# Patient Record
Sex: FEMALE | Race: WHITE | NOT HISPANIC OR LATINO | URBAN - METROPOLITAN AREA
[De-identification: names, ages, dates, MRNs, and addresses within clinical notes are randomized per-mention and may not be internally consistent; named-entity substitution may affect disease eponyms.]

---

## 2024-05-08 ENCOUNTER — OFFICE VISIT (OUTPATIENT)
Dept: PRIMARY CARE | Facility: CLINIC | Age: 24
End: 2024-05-08
Payer: COMMERCIAL

## 2024-05-08 VITALS
HEIGHT: 67 IN | WEIGHT: 142.3 LBS | DIASTOLIC BLOOD PRESSURE: 80 MMHG | BODY MASS INDEX: 22.34 KG/M2 | HEART RATE: 77 BPM | SYSTOLIC BLOOD PRESSURE: 120 MMHG

## 2024-05-08 DIAGNOSIS — S06.0X0D CONCUSSION WITHOUT LOSS OF CONSCIOUSNESS, SUBSEQUENT ENCOUNTER: Primary | ICD-10-CM

## 2024-05-08 PROCEDURE — 1036F TOBACCO NON-USER: CPT | Performed by: INTERNAL MEDICINE

## 2024-05-08 PROCEDURE — 99203 OFFICE O/P NEW LOW 30 MIN: CPT | Performed by: INTERNAL MEDICINE

## 2024-05-08 RX ORDER — DEXTROAMPHETAMINE SACCHARATE, AMPHETAMINE ASPARTATE MONOHYDRATE, DEXTROAMPHETAMINE SULFATE AND AMPHETAMINE SULFATE 5; 5; 5; 5 MG/1; MG/1; MG/1; MG/1
40 CAPSULE, EXTENDED RELEASE ORAL
COMMUNITY
Start: 2024-05-06 | End: 2024-06-05

## 2024-05-08 ASSESSMENT — ENCOUNTER SYMPTOMS
HEADACHES: 0
DIZZINESS: 0
FATIGUE: 0

## 2024-05-08 NOTE — PROGRESS NOTES
"Subjective   Patient ID: Sarah Huizar is a 23 y.o. female who presents for Follow-up (Sarah was seen in there ER and was told to follow up with a Doctor.).    Prevents for ER follow up. 2 weeks ago was on a motorized skateboard (can go up to 20mph) but lost control and fell off. Hit her head but denies LOC. Was confused afterwards and had a HA, so was evaluated in the hospital. Imaging done that revealed SDH, so trauma tried to admit pt but she left AMA for the night. Returned next day for further evaluation. Repeat imaging done. Recommended to follow up with ENT and NSGY and get repeat CTH in 3 weeks. Seen by NSGY, but repeat CTH was never ordered. Pt states since coming home her memory, balance, and hearing out of her L ear have improved but are not back to normal. Headaches overall improved as well, with last being 2 days ago. Never had change in vision. Works in a restaurant.        Review of Systems   Constitutional:  Negative for fatigue.   Musculoskeletal:  Negative for gait problem.   Neurological:  Negative for dizziness and headaches.       /80 (BP Location: Right arm, Patient Position: Sitting, BP Cuff Size: Adult)   Pulse 77   Ht 1.7 m (5' 6.93\")   Wt 64.5 kg (142 lb 4.8 oz)   BMI 22.33 kg/m²   Objective   Physical Exam  Constitutional:       General: She is not in acute distress.     Appearance: She is not ill-appearing, toxic-appearing or diaphoretic.   HENT:      Head: Normocephalic and atraumatic.   Eyes:      Extraocular Movements: Extraocular movements intact.      Conjunctiva/sclera: Conjunctivae normal.      Pupils: Pupils are equal, round, and reactive to light.   Cardiovascular:      Rate and Rhythm: Normal rate and regular rhythm.      Heart sounds: No murmur heard.     No friction rub. No gallop.   Pulmonary:      Effort: Pulmonary effort is normal. No respiratory distress.      Breath sounds: No stridor. No wheezing, rhonchi or rales.   Abdominal:      General: Abdomen is flat. " Bowel sounds are normal. There is no distension.      Palpations: Abdomen is soft.      Tenderness: There is no abdominal tenderness. There is no guarding.   Musculoskeletal:      Cervical back: Normal range of motion. No rigidity or tenderness.   Lymphadenopathy:      Cervical: No cervical adenopathy.   Skin:     General: Skin is warm and dry.   Neurological:      Mental Status: She is alert.      Cranial Nerves: No cranial nerve deficit.      Sensory: No sensory deficit.      Motor: No weakness (5/5 in all 4 extremities).         Assessment/Plan   Problem List Items Addressed This Visit    None  Visit Diagnoses         Codes    Concussion without loss of consciousness, subsequent encounter    -  Primary S06.0X0D        -Pt presenting as follow up from concussion. I reviewed both ER visits, notes, and imaging. Needs return to work letter. Signed letter stating pt can return, but that if she becomes symptomatic then she needs to be relieved.  -Pt encouraged to call NSGY about the recommendation to repeat CTH in 3 weeks (seeing as that would be 3 days from now). Will call after today's appt.  -If pt wants to be established with this office, encouraged to follow up in 1 month.         Daniella Reno MD 05/08/24 12:12 PM

## 2024-05-08 NOTE — LETTER
May 8, 2024     Patient: Sarah Huizar   YOB: 2000   Date of Visit: 5/8/2024       To Whom It May Concern:    Sarah Huizar was seen in my clinic on 5/8/2024 at 11:00 am. She is able to return to work, however if she begins to fatigue or become symptomatic (headache, nausea, dizziness, difficulty walking, etc), then she needs to rest and be excused. It is anticipated that symptoms will gradually improve, but each patient is different in terms of how quickly they recover.      If you have any questions or concerns, please don't hesitate to call.         Sincerely,         Daniella Reno MD

## 2024-06-12 ENCOUNTER — APPOINTMENT (OUTPATIENT)
Dept: PRIMARY CARE | Facility: CLINIC | Age: 24
End: 2024-06-12
Payer: COMMERCIAL

## 2024-12-20 ENCOUNTER — PROCEDURE VISIT (OUTPATIENT)
Dept: AUDIOLOGY | Age: 24
End: 2024-12-20
Payer: COMMERCIAL

## 2024-12-20 ENCOUNTER — OFFICE VISIT (OUTPATIENT)
Dept: ENT CLINIC | Age: 24
End: 2024-12-20

## 2024-12-20 VITALS
RESPIRATION RATE: 16 BRPM | HEIGHT: 67 IN | TEMPERATURE: 97.8 F | BODY MASS INDEX: 23.23 KG/M2 | WEIGHT: 148 LBS | SYSTOLIC BLOOD PRESSURE: 116 MMHG | DIASTOLIC BLOOD PRESSURE: 72 MMHG | HEART RATE: 98 BPM

## 2024-12-20 DIAGNOSIS — H93.8X2 SENSATION OF FULLNESS IN LEFT EAR: ICD-10-CM

## 2024-12-20 DIAGNOSIS — H90.42 SENSORINEURAL HEARING LOSS (SNHL) OF LEFT EAR WITH UNRESTRICTED HEARING OF RIGHT EAR: Primary | ICD-10-CM

## 2024-12-20 DIAGNOSIS — H90.42 SENSORINEURAL HEARING LOSS (SNHL) OF LEFT EAR WITH UNRESTRICTED HEARING OF RIGHT EAR: ICD-10-CM

## 2024-12-20 DIAGNOSIS — S02.19XS: ICD-10-CM

## 2024-12-20 DIAGNOSIS — G43.709 CHRONIC MIGRAINE WITHOUT AURA WITHOUT STATUS MIGRAINOSUS, NOT INTRACTABLE: ICD-10-CM

## 2024-12-20 DIAGNOSIS — H93.12 TINNITUS OF LEFT EAR: ICD-10-CM

## 2024-12-20 DIAGNOSIS — H93.12 TINNITUS OF LEFT EAR: Primary | ICD-10-CM

## 2024-12-20 PROCEDURE — 92567 TYMPANOMETRY: CPT

## 2024-12-20 PROCEDURE — 92557 COMPREHENSIVE HEARING TEST: CPT

## 2024-12-20 RX ORDER — NALTREXONE HYDROCHLORIDE 50 MG/1
TABLET, FILM COATED ORAL
COMMUNITY
Start: 2024-11-25

## 2024-12-20 RX ORDER — MIRTAZAPINE 15 MG/1
15 TABLET, FILM COATED ORAL NIGHTLY
COMMUNITY
Start: 2024-12-13

## 2024-12-20 RX ORDER — QUETIAPINE FUMARATE 200 MG/1
200 TABLET, FILM COATED ORAL NIGHTLY
COMMUNITY
Start: 2024-12-17

## 2024-12-20 ASSESSMENT — ENCOUNTER SYMPTOMS
EYE ITCHING: 0
EYE DISCHARGE: 0
CHOKING: 0
PHOTOPHOBIA: 0
VOICE CHANGE: 0
NAUSEA: 0
TROUBLE SWALLOWING: 0
SORE THROAT: 0
SINUS PRESSURE: 0
DIARRHEA: 0
CONSTIPATION: 0
SHORTNESS OF BREATH: 0
VOMITING: 0
SINUS PAIN: 0
RHINORRHEA: 0
WHEEZING: 0
STRIDOR: 0
FACIAL SWELLING: 0
BACK PAIN: 0
COUGH: 0
BLOOD IN STOOL: 0
COLOR CHANGE: 0

## 2024-12-20 NOTE — PROGRESS NOTES
[facility]  [address] , [address]  [place] [address]  P: [phone]  F: [phone]       Patient     Aziza Fox  2000    ChiefComplaint     Chief Complaint   Patient presents with    Hearing Problem     Fractured skull April since then left ear not hearing so good there was some drainage but nothing now       History of Present Illness     Aziza Fox is a pleasant 24 y.o. female who presents as a new for left-sided tinnitus and muffled hearing.  In April, the patient suffered a left-sided temporal bone fracture.  She was seen at [facility].  Outside CT report reviewed.  She did not follow-up with any outside physician after the injury.  She has been experiencing frequent migraine headaches, brain fog, fluctuation of hearing and significant tinnitus in the left ear.  The tinnitus she describes as a high-pitched nonpulsatile ringing noise.  Sometimes it is nonexistent.  Other times it comes on very intensely and she cannot hear out of the ear.  Denies any syncope.    Past Medical History     Past Medical History:   Diagnosis Date    Headache     Hearing loss        Past Surgical History     No past surgical history on file.    Family History     No family history on file.    Social History     Social History     Socioeconomic History    Marital status: Single     Spouse name: Not on file    Number of children: Not on file    Years of education: Not on file    Highest education level: Not on file   Occupational History    Not on file   Tobacco Use    Smoking status: Every Day     Types: Cigarettes    Smokeless tobacco: Never    Tobacco comments:     Vapes    Substance and Sexual Activity    Alcohol use: Yes     Comment: occ    Drug use: Never    Sexual activity: Not on file   Other Topics Concern    Not on file   Social History Narrative    Not on file     Social Determinants of Health     Financial Resource Strain: Not on file   Food Insecurity: Not on file

## 2024-12-20 NOTE — PROGRESS NOTES
4-8kHz of 15dB or greater  Speech Recognition Threshold: 10 dB HL  Word Recognition: Excellent 100%, based on NU-6 25-word list at 50 dBHL using recorded speech stimuli.    Tympanometry: Normal peak pressure and compliance, Type A tympanogram, consistent with normal middle ear function.       Reliability: Good   Transducer: HF Headphones    See scanned audiogram dated 12/20/2024 for results.        PATIENT EDUCATION:       The following items were discussed with the patient:   - Good Communication Strategies  - Hearing Loss and Hearing Aids  - Tinnitus Management Strategies      Educational information was shared in the After Visit Summary.                                                RECOMMENDATIONS:                                                                                                                                                                                                                                                            The following items are recommended based on patient report and results from today's appointment:   - Continue medical follow-up with Kamron Alvarez DO.   - Retest hearing as medically indicated and/or sooner if a change in hearing is noted.  - Utilize \"Good Communication Strategies\" as discussed to assist in speech understanding with communication partners.  - Maintain a sound enriched environment to assist in the management of tinnitus symptoms.    Shana Gutierrez  Audiologist    Chart CC'd to: Kamron Alvarez DO      Degree of   Hearing Sensitivity dB Range   Within Normal Limits (WNL) 0 - 20   Mild 20 - 40   Moderate 40 - 55   Moderately-Severe 55 - 70   Severe 70 - 90   Profound 90 +

## 2025-02-21 ENCOUNTER — OFFICE VISIT (OUTPATIENT)
Dept: ENT CLINIC | Age: 25
End: 2025-02-21
Payer: COMMERCIAL

## 2025-02-21 VITALS
HEART RATE: 92 BPM | HEIGHT: 67 IN | WEIGHT: 147 LBS | DIASTOLIC BLOOD PRESSURE: 87 MMHG | OXYGEN SATURATION: 92 % | SYSTOLIC BLOOD PRESSURE: 128 MMHG | BODY MASS INDEX: 23.07 KG/M2 | TEMPERATURE: 98.1 F

## 2025-02-21 DIAGNOSIS — S02.19XS: ICD-10-CM

## 2025-02-21 DIAGNOSIS — G43.709 CHRONIC MIGRAINE WITHOUT AURA WITHOUT STATUS MIGRAINOSUS, NOT INTRACTABLE: ICD-10-CM

## 2025-02-21 DIAGNOSIS — H93.12 TINNITUS OF LEFT EAR: Primary | ICD-10-CM

## 2025-02-21 PROCEDURE — 99213 OFFICE O/P EST LOW 20 MIN: CPT | Performed by: OTOLARYNGOLOGY

## 2025-02-21 ASSESSMENT — ENCOUNTER SYMPTOMS
SHORTNESS OF BREATH: 0
SINUS PRESSURE: 0
NAUSEA: 0
STRIDOR: 0
FACIAL SWELLING: 0
COLOR CHANGE: 0
DIARRHEA: 0
TROUBLE SWALLOWING: 0
EYE ITCHING: 0
EYE REDNESS: 0
SORE THROAT: 0
VOICE CHANGE: 0
RHINORRHEA: 0
EYE PAIN: 0
PHOTOPHOBIA: 0
CHOKING: 0
COUGH: 0
SINUS PAIN: 0

## 2025-02-21 NOTE — PROGRESS NOTES
[facility]  [address] , [address]  [place] [address]  P: [phone]  F: [phone]       Patient     Aziza Fox  2000    ChiefComplaint     Chief Complaint   Patient presents with    Follow-up     Pt stated that she I still having the ringing in her ears not as ban anymore but it is still there        History of Present Illness     Aziza Fox is a pleasant 24 y.o. female who presents for follow-up from left-sided temporal bone fracture.  Had some persistent tinnitus of the left ear.  Notes that it has improved.  Less frequent and loud.  She is still having headache issues likely postconcussive migraine type of symptoms.  She has not seen neurology.  She has tried the supplements but not consistently.    Past Medical History     Past Medical History:   Diagnosis Date    Headache     Hearing loss        Past Surgical History     No past surgical history on file.    Family History     No family history on file.    Social History     Social History     Socioeconomic History    Marital status: Single     Spouse name: Not on file    Number of children: Not on file    Years of education: Not on file    Highest education level: Not on file   Occupational History    Not on file   Tobacco Use    Smoking status: Every Day     Types: Cigarettes    Smokeless tobacco: Never    Tobacco comments:     Vapes    Substance and Sexual Activity    Alcohol use: Yes     Comment: occ    Drug use: Never    Sexual activity: Not on file   Other Topics Concern    Not on file   Social History Narrative    Not on file     Social Determinants of Health     Financial Resource Strain: Not on file   Food Insecurity: Not on file   Transportation Needs: Not on file   Physical Activity: Not on file   Stress: Not on file   Social Connections: Not on file   Intimate Partner Violence: Not on file   Housing Stability: Not on file       Allergies     No Known Allergies    Medications     Current Outpatient Medications